# Patient Record
Sex: MALE | URBAN - METROPOLITAN AREA
[De-identification: names, ages, dates, MRNs, and addresses within clinical notes are randomized per-mention and may not be internally consistent; named-entity substitution may affect disease eponyms.]

---

## 2017-06-15 ENCOUNTER — EMERGENCY (EMERGENCY)
Facility: HOSPITAL | Age: 32
LOS: 1 days | Discharge: HOME | End: 2017-06-15
Admitting: EMERGENCY MEDICINE
Payer: SELF-PAY

## 2017-06-15 VITALS
TEMPERATURE: 99 F | RESPIRATION RATE: 15 BRPM | DIASTOLIC BLOOD PRESSURE: 86 MMHG | OXYGEN SATURATION: 99 % | SYSTOLIC BLOOD PRESSURE: 142 MMHG | HEART RATE: 78 BPM

## 2017-06-15 PROCEDURE — 73564 X-RAY EXAM KNEE 4 OR MORE: CPT | Mod: 26,LT

## 2017-06-15 PROCEDURE — 99284 EMERGENCY DEPT VISIT MOD MDM: CPT

## 2017-06-15 RX ORDER — IBUPROFEN 200 MG
600 TABLET ORAL ONCE
Qty: 0 | Refills: 0 | Status: COMPLETED | OUTPATIENT
Start: 2017-06-15 | End: 2017-06-15

## 2017-06-15 RX ADMIN — Medication 600 MILLIGRAM(S): at 21:08

## 2017-06-15 NOTE — ED PROVIDER NOTE - PHYSICAL EXAMINATION
Left knee: mild swelling, +TTP at medial aspect of knee, decreased ROM secondary to pain, pt ambulatory with slight limp.

## 2017-06-15 NOTE — ED ADULT TRIAGE NOTE - CHIEF COMPLAINT QUOTE
Pt c/o left knee pain for a few hours 2/2 falling while playing basketball.  Pt ambulatory to triage.

## 2017-06-15 NOTE — ED PROVIDER NOTE - MEDICAL DECISION MAKING DETAILS
Left knee injury fx vs ligamentous injury will obtain xray, RICE, motrin, Knee immobilizer, follow up orthopedics.

## 2017-06-15 NOTE — ED PROVIDER NOTE - OBJECTIVE STATEMENT
32 y/o male with a hx of Asthma presents to the ER c/o left knee pain x 1 day.  Pt states he was playing basketball when he fell twisting his knee and then another player landed on his knee.  Pt denies weakness, numbness, head trauma, loc.  Pt ambulatory with slight limp.

## 2017-06-15 NOTE — ED PROVIDER NOTE - CARE PLAN
Principal Discharge DX:	Knee pain  Instructions for follow-up, activity and diet:	Follow up with orthopedics in 1-2 days.  (See list attached)  Rest, Ice 3-4 x a day x 48hours, elevate knee, knee immobilizer.  Use crutches to ambulate.  Take Motrin 600mg orally every 8 hours as needed for pain take with food.  Return to the ER for any persistent/worsening or new symptoms weakness, numbness or any concerning symptoms.

## 2017-06-15 NOTE — ED PROVIDER NOTE - PLAN OF CARE
Follow up with orthopedics in 1-2 days.  (See list attached)  Rest, Ice 3-4 x a day x 48hours, elevate knee, knee immobilizer.  Use crutches to ambulate.  Take Motrin 600mg orally every 8 hours as needed for pain take with food.  Return to the ER for any persistent/worsening or new symptoms weakness, numbness or any concerning symptoms.